# Patient Record
Sex: FEMALE | Race: WHITE
[De-identification: names, ages, dates, MRNs, and addresses within clinical notes are randomized per-mention and may not be internally consistent; named-entity substitution may affect disease eponyms.]

---

## 2022-01-13 ENCOUNTER — HOSPITAL ENCOUNTER (INPATIENT)
Dept: HOSPITAL 26 - MED | Age: 72
LOS: 4 days | Discharge: HOME | DRG: 308 | End: 2022-01-17
Attending: STUDENT IN AN ORGANIZED HEALTH CARE EDUCATION/TRAINING PROGRAM | Admitting: STUDENT IN AN ORGANIZED HEALTH CARE EDUCATION/TRAINING PROGRAM
Payer: COMMERCIAL

## 2022-01-13 VITALS — SYSTOLIC BLOOD PRESSURE: 108 MMHG | DIASTOLIC BLOOD PRESSURE: 72 MMHG

## 2022-01-13 VITALS — WEIGHT: 113.05 LBS | BODY MASS INDEX: 20.03 KG/M2 | HEIGHT: 63 IN

## 2022-01-13 DIAGNOSIS — Z85.3: ICD-10-CM

## 2022-01-13 DIAGNOSIS — I42.9: ICD-10-CM

## 2022-01-13 DIAGNOSIS — I47.1: ICD-10-CM

## 2022-01-13 DIAGNOSIS — E44.1: ICD-10-CM

## 2022-01-13 DIAGNOSIS — Z60.2: ICD-10-CM

## 2022-01-13 DIAGNOSIS — E03.9: ICD-10-CM

## 2022-01-13 DIAGNOSIS — I50.43: ICD-10-CM

## 2022-01-13 DIAGNOSIS — I48.0: Primary | ICD-10-CM

## 2022-01-13 DIAGNOSIS — Z88.2: ICD-10-CM

## 2022-01-13 DIAGNOSIS — E83.42: ICD-10-CM

## 2022-01-13 DIAGNOSIS — J96.01: ICD-10-CM

## 2022-01-13 DIAGNOSIS — Z20.822: ICD-10-CM

## 2022-01-13 DIAGNOSIS — Z88.1: ICD-10-CM

## 2022-01-13 DIAGNOSIS — Z79.899: ICD-10-CM

## 2022-01-13 DIAGNOSIS — M06.9: ICD-10-CM

## 2022-01-13 DIAGNOSIS — Z90.12: ICD-10-CM

## 2022-01-13 DIAGNOSIS — D84.9: ICD-10-CM

## 2022-01-13 LAB
ALBUMIN FLD-MCNC: 3 G/DL (ref 3.4–5)
AMYLASE SERPL-CCNC: 24 U/L (ref 25–115)
ANION GAP SERPL CALCULATED.3IONS-SCNC: 15.6 MMOL/L (ref 8–16)
APPEARANCE UR: CLEAR
AST SERPL-CCNC: 30 U/L (ref 15–37)
BASOPHILS # BLD AUTO: 0 K/UL (ref 0–0.22)
BASOPHILS NFR BLD AUTO: 0.3 % (ref 0–2)
BILIRUB SERPL-MCNC: 0.6 MG/DL (ref 0–1)
BILIRUB UR QL STRIP: NEGATIVE
BUN SERPL-MCNC: 15 MG/DL (ref 7–18)
CHLORIDE SERPL-SCNC: 108 MMOL/L (ref 98–107)
CHOLEST/HDLC SERPL: 2.2 {RATIO} (ref 1–4.5)
CO2 SERPL-SCNC: 21.5 MMOL/L (ref 21–32)
COLOR UR: YELLOW
CREAT SERPL-MCNC: 0.6 MG/DL (ref 0.6–1.3)
EOSINOPHIL # BLD AUTO: 0 K/UL (ref 0–0.4)
EOSINOPHIL NFR BLD AUTO: 0.2 % (ref 0–4)
ERYTHROCYTE [DISTWIDTH] IN BLOOD BY AUTOMATED COUNT: 17 % (ref 11.6–13.7)
GFR SERPL CREATININE-BSD FRML MDRD: (no result) ML/MIN (ref 90–?)
GLUCOSE SERPL-MCNC: 99 MG/DL (ref 74–106)
GLUCOSE UR STRIP-MCNC: NEGATIVE MG/DL
HCT VFR BLD AUTO: 39 % (ref 36–48)
HDLC SERPL-MCNC: 90 MG/DL (ref 40–60)
HGB BLD-MCNC: 12.4 G/DL (ref 12–16)
HGB UR QL STRIP: (no result)
LDLC SERPL CALC-MCNC: 97 MG/DL (ref 60–100)
LEUKOCYTE ESTERASE UR QL STRIP: NEGATIVE
LIPASE SERPL-CCNC: 56 U/L (ref 73–393)
LYMPHOCYTES # BLD AUTO: 0.6 K/UL (ref 2.5–16.5)
LYMPHOCYTES NFR BLD AUTO: 8.2 % (ref 20.5–51.1)
MAGNESIUM SERPL-MCNC: 1.7 MG/DL (ref 1.8–2.4)
MCH RBC QN AUTO: 26 PG (ref 27–31)
MCHC RBC AUTO-ENTMCNC: 32 G/DL (ref 33–37)
MCV RBC AUTO: 81.6 FL (ref 80–94)
MONOCYTES # BLD AUTO: 0.6 K/UL (ref 0.8–1)
MONOCYTES NFR BLD AUTO: 8 % (ref 1.7–9.3)
NEUTROPHILS # BLD AUTO: 6.6 K/UL (ref 1.8–7.7)
NEUTROPHILS NFR BLD AUTO: 83.3 % (ref 42.2–75.2)
NITRITE UR QL STRIP: NEGATIVE
PH UR STRIP: 5.5 [PH] (ref 5–9)
PHOSPHATE SERPL-MCNC: 3.3 MG/DL (ref 2.5–4.9)
PHOSPHATE SERPL-MCNC: 3.3 MG/DL (ref 2.5–4.9)
PLATELET # BLD AUTO: 282 K/UL (ref 140–450)
POTASSIUM SERPL-SCNC: 4.1 MMOL/L (ref 3.5–5.1)
PROTHROMBIN TIME: 11 SECS (ref 10.8–13.4)
RBC # BLD AUTO: 4.78 MIL/UL (ref 4.2–5.4)
SODIUM SERPL-SCNC: 141 MMOL/L (ref 136–145)
T4 FREE SERPL-MCNC: 1.11 NG/DL (ref 0.76–1.46)
T4 FREE SERPL-MCNC: 1.18 NG/DL (ref 0.76–1.46)
TRIGL SERPL-MCNC: 73 MG/DL (ref 30–150)
TSH SERPL DL<=0.05 MIU/L-ACNC: 0.7 UIU/ML (ref 0.34–3.74)
TSH SERPL DL<=0.05 MIU/L-ACNC: 0.7 UIU/ML (ref 0.34–3.74)
WBC # BLD AUTO: 7.9 K/UL (ref 4.8–10.8)

## 2022-01-13 RX ADMIN — MORPHINE SULFATE PRN MG: 2 INJECTION, SOLUTION INTRAMUSCULAR; INTRAVENOUS at 23:15

## 2022-01-13 RX ADMIN — SODIUM CHLORIDE SCH MLS/HR: 9 INJECTION, SOLUTION INTRAVENOUS at 23:10

## 2022-01-13 SDOH — SOCIAL STABILITY - SOCIAL INSECURITY: PROBLEMS RELATED TO LIVING ALONE: Z60.2

## 2022-01-13 NOTE — NUR
-------------------------------------------------------------------------------

            *** Note undone in ED - 01/13/22 at 1945 by [facility] ***             

-------------------------------------------------------------------------------

Pt report given to FORREST RN. Transfer of care at this time.

## 2022-01-13 NOTE — NUR
72 Y/O FEMALE BIBA FROM MD OFFICE DUE TO ELEVATED HR/DYSRHYTHMIA. PT STATES SHE 
HAS A DISCOMFORT IN HER CHEST FROM THE PALPITATIONS. DENIES CP/N/V/D AT THIS 
TIME. AOX4, ABLE TO MAKE NEEDS KNOWN. ALBE TO FOLLOW COMMANDS. PT NOTED IN SVT 
ON MONITOR AND MADE ERMD AWARE. RESP EVEN AND UNLABORED ON RA. 



PMHX: BILAT HAND CONTRACTURES, RA, BREAST CA(METS)

ALLERGIES: SULFA DRUGS

HOME MEDS: SEE LIST IN CHART

## 2022-01-14 LAB
ALBUMIN FLD-MCNC: 3.3 G/DL (ref 3.4–5)
ANION GAP SERPL CALCULATED.3IONS-SCNC: 19 MMOL/L (ref 8–16)
AST SERPL-CCNC: 25 U/L (ref 15–37)
BARBITURATES UR QL SCN: NEGATIVE NG/ML
BASOPHILS # BLD AUTO: 0 K/UL (ref 0–0.22)
BASOPHILS NFR BLD AUTO: 0.8 % (ref 0–2)
BENZODIAZ UR QL SCN: NEGATIVE NG/ML
BILIRUB SERPL-MCNC: 1 MG/DL (ref 0–1)
BUN SERPL-MCNC: 12 MG/DL (ref 7–18)
BZE UR QL SCN: NEGATIVE NG/ML
CANNABINOIDS UR QL SCN: NEGATIVE NG/ML
CHLORIDE SERPL-SCNC: 104 MMOL/L (ref 98–107)
CO2 SERPL-SCNC: 21.9 MMOL/L (ref 21–32)
CREAT SERPL-MCNC: 0.7 MG/DL (ref 0.6–1.3)
EOSINOPHIL # BLD AUTO: 0.1 K/UL (ref 0–0.4)
EOSINOPHIL NFR BLD AUTO: 1.4 % (ref 0–4)
ERYTHROCYTE [DISTWIDTH] IN BLOOD BY AUTOMATED COUNT: 16.8 % (ref 11.6–13.7)
GFR SERPL CREATININE-BSD FRML MDRD: (no result) ML/MIN (ref 90–?)
GLUCOSE SERPL-MCNC: 123 MG/DL (ref 74–106)
HCT VFR BLD AUTO: 40.1 % (ref 36–48)
HGB BLD-MCNC: 12.9 G/DL (ref 12–16)
LYMPHOCYTES # BLD AUTO: 0.9 K/UL (ref 2.5–16.5)
LYMPHOCYTES NFR BLD AUTO: 13.4 % (ref 20.5–51.1)
MAGNESIUM SERPL-MCNC: 1.9 MG/DL (ref 1.8–2.4)
MCH RBC QN AUTO: 26 PG (ref 27–31)
MCHC RBC AUTO-ENTMCNC: 32 G/DL (ref 33–37)
MCV RBC AUTO: 80.5 FL (ref 80–94)
MONOCYTES # BLD AUTO: 0.3 K/UL (ref 0.8–1)
MONOCYTES NFR BLD AUTO: 4.7 % (ref 1.7–9.3)
NEUTROPHILS # BLD AUTO: 5.2 K/UL (ref 1.8–7.7)
NEUTROPHILS NFR BLD AUTO: 79.7 % (ref 42.2–75.2)
OPIATES UR QL SCN: POSITIVE NG/ML
PCP UR QL SCN: NEGATIVE NG/ML
PLATELET # BLD AUTO: 325 K/UL (ref 140–450)
POTASSIUM SERPL-SCNC: 3.9 MMOL/L (ref 3.5–5.1)
RBC # BLD AUTO: 4.98 MIL/UL (ref 4.2–5.4)
RBC #/AREA URNS HPF: (no result) /HPF (ref 0–5)
SODIUM SERPL-SCNC: 141 MMOL/L (ref 136–145)
WBC # BLD AUTO: 6.5 K/UL (ref 4.8–10.8)
WBC,URINE: (no result) /HPF (ref 0–5)

## 2022-01-14 RX ADMIN — SODIUM CHLORIDE PRN MG: 9 INJECTION, SOLUTION INTRAVENOUS at 04:09

## 2022-01-14 RX ADMIN — MORPHINE SULFATE PRN MG: 2 INJECTION, SOLUTION INTRAMUSCULAR; INTRAVENOUS at 04:10

## 2022-01-14 RX ADMIN — SODIUM CHLORIDE SCH MLS/HR: 9 INJECTION, SOLUTION INTRAVENOUS at 05:30

## 2022-01-14 RX ADMIN — AMIODARONE HYDROCHLORIDE SCH MG: 200 TABLET ORAL at 10:31

## 2022-01-14 RX ADMIN — AMIODARONE HYDROCHLORIDE SCH MG: 200 TABLET ORAL at 20:05

## 2022-01-14 RX ADMIN — SODIUM CHLORIDE SCH MLS/HR: 9 INJECTION, SOLUTION INTRAVENOUS at 19:06

## 2022-01-14 RX ADMIN — MORPHINE SULFATE PRN MG: 2 INJECTION, SOLUTION INTRAMUSCULAR; INTRAVENOUS at 20:18

## 2022-01-14 RX ADMIN — SODIUM CHLORIDE PRN MG: 9 INJECTION, SOLUTION INTRAVENOUS at 20:18

## 2022-01-14 RX ADMIN — MORPHINE SULFATE PRN MG: 2 INJECTION, SOLUTION INTRAMUSCULAR; INTRAVENOUS at 10:48

## 2022-01-14 RX ADMIN — HYDROCODONE BITARTRATE AND ACETAMINOPHEN PRN TAB: 5; 325 TABLET ORAL at 13:25

## 2022-01-14 NOTE — NUR
Patient appears to be resting comfortably in bed. Vital Signs within normal 
limits. Respirations even and unlabored. All needs met, safety measures in 
place. Call light in reach.

## 2022-01-14 NOTE — NUR
PT IS AWAKE AND ALERT. VSS. PT IS IN STABLE CONDITION. PT DENIES CHEST PAIN AT 
THIS TIME. ALL NEEDS MET. BED LOCKED IN LOWEST POSITION, SIDE RAILS X2.

## 2022-01-14 NOTE — NUR
PATIENT HAS BEEN SCREENED AND CATEGORIZED AS MODERATE NUTRITION RISK. PATIENT WILL BE SEEN 
WITHIN 3-5 DAYS OF ADMISSION.



1/14/221/18/22



THEA MARTIN RD

## 2022-01-14 NOTE — NUR
Patient appears to be resting comfortably in bed. Vital Signs within normal 
limits. Respirations even and unlabored. Call light in reach, all needs met at 
this time

## 2022-01-14 NOTE — NUR
patient complaining of c/p that feels like pressure. originally pain was on the 
shoulders but have migrated to the chest

## 2022-01-14 NOTE — NUR
Patient appears to be resting comfortably in bed- semi fowlers, with eyes 
closed and having a 2L NC. Respirations even and unlabored. Safety measures are 
in place, attached to monitors and will continue to monitor patient. 
Medications are running appropriately. No signs of distress noted on patient.

## 2022-01-14 NOTE — NUR
PT STATES SHE FEELS A LOT BETTER WITH NONREBREATHER, RR 21. RECEIVED CALL BACK 
FROM [facility], ORDERS CARRIED OUT.

## 2022-01-14 NOTE — NUR
Pt moved to room 9 at this time. Purewick set up to suction. 400cc clear yellow 
urine obtained at this time.

## 2022-01-14 NOTE — NUR
patient feeling dizziness and discomfort HR going to 161- performed vasovagal 
maneuver and patient reverted HR going 70s.

## 2022-01-14 NOTE — NUR
Consulted with Dr Salinas about possible downgrade, per MD " let's see how her 
bp does before downgrading."

## 2022-01-14 NOTE — NUR
Pt with a c/o dull chest pain at this time. New orders noted and carried out. 
Pt VSS, remains AOX4 able to make all needs known.

## 2022-01-14 NOTE — NUR
PT REPORTED SUDDEN ONSET OF CHEST PAIN AND SOB. VSS. O2 INCREASED TO 4L. RT 
PAGED AND AT BEDSIDE. PAGED CRITICAL CARE WAITING FOR CALL BACK.

## 2022-01-15 LAB
ALBUMIN FLD-MCNC: 2.3 G/DL (ref 3.4–5)
ANION GAP SERPL CALCULATED.3IONS-SCNC: 15.6 MMOL/L (ref 8–16)
AST SERPL-CCNC: 30 U/L (ref 15–37)
BASOPHILS # BLD AUTO: 0 K/UL (ref 0–0.22)
BASOPHILS NFR BLD AUTO: 0.5 % (ref 0–2)
BILIRUB SERPL-MCNC: 1.1 MG/DL (ref 0–1)
BUN SERPL-MCNC: 10 MG/DL (ref 7–18)
CHLORIDE SERPL-SCNC: 109 MMOL/L (ref 98–107)
CO2 SERPL-SCNC: 22.4 MMOL/L (ref 21–32)
CREAT SERPL-MCNC: 0.5 MG/DL (ref 0.6–1.3)
EOSINOPHIL # BLD AUTO: 0 K/UL (ref 0–0.4)
EOSINOPHIL NFR BLD AUTO: 0.3 % (ref 0–4)
ERYTHROCYTE [DISTWIDTH] IN BLOOD BY AUTOMATED COUNT: 16 % (ref 11.6–13.7)
GFR SERPL CREATININE-BSD FRML MDRD: (no result) ML/MIN (ref 90–?)
GLUCOSE SERPL-MCNC: 64 MG/DL (ref 74–106)
HCT VFR BLD AUTO: 33.7 % (ref 36–48)
HGB BLD-MCNC: 11 G/DL (ref 12–16)
LYMPHOCYTES # BLD AUTO: 0.3 K/UL (ref 2.5–16.5)
LYMPHOCYTES NFR BLD AUTO: 3.6 % (ref 20.5–51.1)
MAGNESIUM SERPL-MCNC: 1.4 MG/DL (ref 1.8–2.4)
MCH RBC QN AUTO: 26 PG (ref 27–31)
MCHC RBC AUTO-ENTMCNC: 33 G/DL (ref 33–37)
MCV RBC AUTO: 79.7 FL (ref 80–94)
MONOCYTES # BLD AUTO: 0.6 K/UL (ref 0.8–1)
MONOCYTES NFR BLD AUTO: 6.1 % (ref 1.7–9.3)
NEUTROPHILS # BLD AUTO: 8.1 K/UL (ref 1.8–7.7)
NEUTROPHILS NFR BLD AUTO: 89.5 % (ref 42.2–75.2)
PLATELET # BLD AUTO: 219 K/UL (ref 140–450)
POTASSIUM SERPL-SCNC: 4 MMOL/L (ref 3.5–5.1)
RBC # BLD AUTO: 4.22 MIL/UL (ref 4.2–5.4)
SODIUM SERPL-SCNC: 143 MMOL/L (ref 136–145)
WBC # BLD AUTO: 9.1 K/UL (ref 4.8–10.8)

## 2022-01-15 RX ADMIN — AMIODARONE HYDROCHLORIDE SCH MG: 200 TABLET ORAL at 21:57

## 2022-01-15 RX ADMIN — FUROSEMIDE SCH MG: 10 INJECTION, SOLUTION INTRAMUSCULAR; INTRAVENOUS at 10:57

## 2022-01-15 RX ADMIN — APIXABAN SCH MG: 2.5 TABLET, FILM COATED ORAL at 14:30

## 2022-01-15 RX ADMIN — MORPHINE SULFATE PRN MG: 2 INJECTION, SOLUTION INTRAMUSCULAR; INTRAVENOUS at 14:34

## 2022-01-15 RX ADMIN — AMIODARONE HYDROCHLORIDE SCH MG: 200 TABLET ORAL at 10:56

## 2022-01-15 RX ADMIN — MORPHINE SULFATE PRN MG: 2 INJECTION, SOLUTION INTRAMUSCULAR; INTRAVENOUS at 01:50

## 2022-01-15 RX ADMIN — APIXABAN SCH MG: 2.5 TABLET, FILM COATED ORAL at 21:57

## 2022-01-15 RX ADMIN — SODIUM CHLORIDE SCH MLS/HR: 9 INJECTION, SOLUTION INTRAVENOUS at 19:25

## 2022-01-15 RX ADMIN — FUROSEMIDE SCH MG: 10 INJECTION, SOLUTION INTRAMUSCULAR; INTRAVENOUS at 17:29

## 2022-01-15 RX ADMIN — MORPHINE SULFATE PRN MG: 2 INJECTION, SOLUTION INTRAMUSCULAR; INTRAVENOUS at 17:29

## 2022-01-15 RX ADMIN — HYDROCODONE BITARTRATE AND ACETAMINOPHEN PRN TAB: 5; 325 TABLET ORAL at 23:05

## 2022-01-15 NOTE — NUR
Patient resting in semi-fowlers with both eyes closed. 15L by NRB and cardiac 
monitor remains in place. SpO2 100%. Pt denies SOB, chest pain, dizziness at 
this time. Bed locked in lowest position, side rails x 2, call light in reach.

## 2022-01-15 NOTE — NUR
PT WAS CLEANED UP, GIVEN NEW GOWN AND LINENS REPLACED. PT PLACED IN BED SEMI 
FOWLERS, BED IN LOWEST SETTING, AND RAILS UP X2.

## 2022-01-15 NOTE — NUR
Pt reports + relief to pain; 7/10 at this time. Purewick remains in place. Bed 
locked in lowest position, side rails x 2.

## 2022-01-15 NOTE — NUR
PT HAS EYES CLOSED. OPENS TO SOUND. EQUAL RISE AND FALL OF CHEST WALL. NO 
INCREASED WORK OF BREATHING OBSERVED. VSS. PT IS IN STABLE CONDITION. BED 
LOCKED IN LOWEST POSITION, SIDE RAILS X2 FOR SAFETY.

## 2022-01-15 NOTE — NUR
Patient resting in semi-fowlers position. Pt reports Fentanyl patch remains in 
place however she is in pain. Advised of next dose of Morphine. Bed locked in 
lowest position, side rails x 2.

## 2022-01-16 VITALS — SYSTOLIC BLOOD PRESSURE: 102 MMHG | DIASTOLIC BLOOD PRESSURE: 49 MMHG

## 2022-01-16 VITALS — SYSTOLIC BLOOD PRESSURE: 109 MMHG | DIASTOLIC BLOOD PRESSURE: 56 MMHG

## 2022-01-16 VITALS — DIASTOLIC BLOOD PRESSURE: 78 MMHG | SYSTOLIC BLOOD PRESSURE: 134 MMHG

## 2022-01-16 VITALS — DIASTOLIC BLOOD PRESSURE: 46 MMHG | SYSTOLIC BLOOD PRESSURE: 95 MMHG

## 2022-01-16 LAB
ALBUMIN FLD-MCNC: 2.7 G/DL (ref 3.4–5)
ANION GAP SERPL CALCULATED.3IONS-SCNC: 11.5 MMOL/L (ref 8–16)
AST SERPL-CCNC: 26 U/L (ref 15–37)
BASOPHILS # BLD AUTO: 0 K/UL (ref 0–0.22)
BASOPHILS NFR BLD AUTO: 0.3 % (ref 0–2)
BILIRUB SERPL-MCNC: 1.4 MG/DL (ref 0–1)
BUN SERPL-MCNC: 15 MG/DL (ref 7–18)
CHLORIDE SERPL-SCNC: 99 MMOL/L (ref 98–107)
CO2 SERPL-SCNC: 31 MMOL/L (ref 21–32)
CREAT SERPL-MCNC: 0.6 MG/DL (ref 0.6–1.3)
EOSINOPHIL # BLD AUTO: 0.1 K/UL (ref 0–0.4)
EOSINOPHIL NFR BLD AUTO: 0.8 % (ref 0–4)
ERYTHROCYTE [DISTWIDTH] IN BLOOD BY AUTOMATED COUNT: 16.1 % (ref 11.6–13.7)
GFR SERPL CREATININE-BSD FRML MDRD: (no result) ML/MIN (ref 90–?)
GLUCOSE SERPL-MCNC: 79 MG/DL (ref 74–106)
HCT VFR BLD AUTO: 38.4 % (ref 36–48)
HGB BLD-MCNC: 12.5 G/DL (ref 12–16)
LYMPHOCYTES # BLD AUTO: 0.5 K/UL (ref 2.5–16.5)
LYMPHOCYTES NFR BLD AUTO: 5.3 % (ref 20.5–51.1)
MAGNESIUM SERPL-MCNC: 2.1 MG/DL (ref 1.8–2.4)
MCH RBC QN AUTO: 26 PG (ref 27–31)
MCHC RBC AUTO-ENTMCNC: 33 G/DL (ref 33–37)
MCV RBC AUTO: 79.3 FL (ref 80–94)
MONOCYTES # BLD AUTO: 0.7 K/UL (ref 0.8–1)
MONOCYTES NFR BLD AUTO: 7 % (ref 1.7–9.3)
NEUTROPHILS # BLD AUTO: 8.7 K/UL (ref 1.8–7.7)
NEUTROPHILS NFR BLD AUTO: 86.6 % (ref 42.2–75.2)
PLATELET # BLD AUTO: 275 K/UL (ref 140–450)
POTASSIUM SERPL-SCNC: 3.5 MMOL/L (ref 3.5–5.1)
RBC # BLD AUTO: 4.85 MIL/UL (ref 4.2–5.4)
SODIUM SERPL-SCNC: 138 MMOL/L (ref 136–145)
WBC # BLD AUTO: 10 K/UL (ref 4.8–10.8)

## 2022-01-16 RX ADMIN — FUROSEMIDE SCH MG: 10 INJECTION, SOLUTION INTRAMUSCULAR; INTRAVENOUS at 16:50

## 2022-01-16 RX ADMIN — FUROSEMIDE SCH MG: 10 INJECTION, SOLUTION INTRAMUSCULAR; INTRAVENOUS at 09:14

## 2022-01-16 RX ADMIN — SODIUM CHLORIDE SCH MLS/HR: 9 INJECTION, SOLUTION INTRAVENOUS at 16:51

## 2022-01-16 RX ADMIN — AMIODARONE HYDROCHLORIDE SCH MG: 200 TABLET ORAL at 09:13

## 2022-01-16 RX ADMIN — AMIODARONE HYDROCHLORIDE SCH MG: 200 TABLET ORAL at 22:04

## 2022-01-16 RX ADMIN — MORPHINE SULFATE PRN MG: 2 INJECTION, SOLUTION INTRAMUSCULAR; INTRAVENOUS at 18:27

## 2022-01-16 RX ADMIN — MORPHINE SULFATE PRN MG: 2 INJECTION, SOLUTION INTRAMUSCULAR; INTRAVENOUS at 03:48

## 2022-01-16 RX ADMIN — APIXABAN SCH MG: 2.5 TABLET, FILM COATED ORAL at 22:05

## 2022-01-16 RX ADMIN — MORPHINE SULFATE PRN MG: 2 INJECTION, SOLUTION INTRAMUSCULAR; INTRAVENOUS at 09:14

## 2022-01-16 RX ADMIN — APIXABAN SCH MG: 2.5 TABLET, FILM COATED ORAL at 09:13

## 2022-01-16 RX ADMIN — MORPHINE SULFATE PRN MG: 2 INJECTION, SOLUTION INTRAMUSCULAR; INTRAVENOUS at 13:26

## 2022-01-16 NOTE — NUR
PRN PAIN MEDICATION ADMINISTERED PER MD ORDER, PT TOLERATED ADMINISTRATION. PT EDUCATION 
PROVIDED. PT ASSISTED TO THE RESTROOM AND BACK IN BED. NOW SITTING UP EATING DINNER. ALL 
NEEDS HAVE BEEN MET THROUGHOUT THE SHIFT. WILL ENDORSE TO NIGHT SHIFT NURSE

## 2022-01-16 NOTE — NUR
PT REPORTS PAIN 8/10, MEDICATED PER MD ORDER. PT TOLERATED ADMINISTRATION, IV IS PATENT. ALL 
SAFETY MEASURES IN PLACE. CALL LIGHT WITHIN REACH WILL CONTINUE TO MONITOR. SON AT BEDSIDE

## 2022-01-16 NOTE — NUR
Patient will be admitted to care of Dr. Salinas. Admited to Telemetry. Will go 
to room 106B. Belongings list completed.  Report to JIAN Purdy.

## 2022-01-16 NOTE — NUR
PT HAS BEEN BROUGHT IN BY THE ED BY BRENDEN IN STABLE CONDITION. PT AMBULATED WITH ASSIST TO 
THE RESTROOM. PT PLACED ON TELE MONITOR ON TELE BOX 26. PT REPORT 8/10 PAIN. STATES ITS 
CHRONIC. WILL MEDICATE PER MD ORDER. PT DENIES HAVING SOB, CHEST PAIN, PALPITATION, OR 
FEVERS. PT IS ON 3L NC [place] WITH SYMMETRICAL RESP. CRACKLES HEARD THROUGHOUT. NO WOUNDS 
NOTED, NO EDEMA NOTED. IV IS PATENT AND INTACT IN RIGHT FOREARM 20G. ALL SAFETY MEASURES IN 
PLACE, CALL LIGHT WITHIN REACH AND EDUCATION PROVIDED ON IMPORTANCE OF USING THE LIGHT 
BEFORE GETTING OUT OF BED. WILL CONTINUE TO MONITOR.

## 2022-01-16 NOTE — NUR
ALL NEEDS HAVE BEEN MET THROUGHOUT SHIFT, PT REMAINED STABLE. ALL SAFETY MEASURES IN PLACE, 
CALL LIGHT WITHIN REACH. PT WILL BE ENDORSED TO NIGHT SHIFT NURSE FOR CONTINUITY OF CARE.

## 2022-01-16 NOTE — NUR
PRN PAIN MEDICATION ADMINISTERED PER MD ORDER FOR 8/10 PAIN. PT IV IS PATENT. PT TOLERATED 
ADMINISTRATION AND VERBALIZE UNDERSTANDING. ALL SAFETY MEASURES IN PLACE, CALL LIGHT WITHIN 
REACH. WILL CONTINUE TO MONITOR.

## 2022-01-16 NOTE — NUR
PIERRE MEDICATION ADMINISTERED PER MD ORDER, PT TOLERATED ADMINISTRATION. PT IV PATENT AND 
INTACT. PRN PAIN MEDICATION ADMINISTERED PER MD ORDER. PT IS IN STABLE CONDITION.

## 2022-01-16 NOTE — NUR
SPOKE WITH MD REGARDING PTS MEDICATION, STATED TO CONVERT MEDICATION FOR IN HOSPITAL. ORDERS 
PLACED. FENTANYL PATCH PLACED PER MD ORDER, PT PROVIDED PERSONNEL FENTANYL PATCH FROM HOME, 
CALLED [facility], STATED TO PLACE IN [place].

## 2022-01-16 NOTE — NUR
PT PRESENTS LAYING IN BED WITH NO SIGNS OF RESPIRATORY DISTRESS SATING 97% ON RA. WILL 
CONTINUE TO MONITOR.

## 2022-01-17 VITALS — SYSTOLIC BLOOD PRESSURE: 115 MMHG | DIASTOLIC BLOOD PRESSURE: 42 MMHG

## 2022-01-17 VITALS — SYSTOLIC BLOOD PRESSURE: 102 MMHG | DIASTOLIC BLOOD PRESSURE: 42 MMHG

## 2022-01-17 VITALS — SYSTOLIC BLOOD PRESSURE: 104 MMHG | DIASTOLIC BLOOD PRESSURE: 50 MMHG

## 2022-01-17 VITALS — DIASTOLIC BLOOD PRESSURE: 42 MMHG | SYSTOLIC BLOOD PRESSURE: 115 MMHG

## 2022-01-17 LAB
ALBUMIN FLD-MCNC: 2.7 G/DL (ref 3.4–5)
ANION GAP SERPL CALCULATED.3IONS-SCNC: 11.6 MMOL/L (ref 8–16)
AST SERPL-CCNC: 27 U/L (ref 15–37)
BASOPHILS # BLD AUTO: 0 K/UL (ref 0–0.22)
BASOPHILS NFR BLD AUTO: 0.5 % (ref 0–2)
BILIRUB SERPL-MCNC: 1.1 MG/DL (ref 0–1)
BUN SERPL-MCNC: 18 MG/DL (ref 7–18)
CHLORIDE SERPL-SCNC: 99 MMOL/L (ref 98–107)
CO2 SERPL-SCNC: 31.7 MMOL/L (ref 21–32)
CREAT SERPL-MCNC: 0.7 MG/DL (ref 0.6–1.3)
EOSINOPHIL # BLD AUTO: 0.2 K/UL (ref 0–0.4)
EOSINOPHIL NFR BLD AUTO: 2 % (ref 0–4)
ERYTHROCYTE [DISTWIDTH] IN BLOOD BY AUTOMATED COUNT: 16.2 % (ref 11.6–13.7)
GFR SERPL CREATININE-BSD FRML MDRD: (no result) ML/MIN (ref 90–?)
GLUCOSE SERPL-MCNC: 85 MG/DL (ref 74–106)
HCT VFR BLD AUTO: 37.6 % (ref 36–48)
HGB BLD-MCNC: 12.5 G/DL (ref 12–16)
LYMPHOCYTES # BLD AUTO: 0.6 K/UL (ref 2.5–16.5)
LYMPHOCYTES NFR BLD AUTO: 7.1 % (ref 20.5–51.1)
MAGNESIUM SERPL-MCNC: 1.6 MG/DL (ref 1.8–2.4)
MCH RBC QN AUTO: 26 PG (ref 27–31)
MCHC RBC AUTO-ENTMCNC: 33 G/DL (ref 33–37)
MCV RBC AUTO: 79 FL (ref 80–94)
MONOCYTES # BLD AUTO: 0.6 K/UL (ref 0.8–1)
MONOCYTES NFR BLD AUTO: 7.4 % (ref 1.7–9.3)
NEUTROPHILS # BLD AUTO: 7 K/UL (ref 1.8–7.7)
NEUTROPHILS NFR BLD AUTO: 83 % (ref 42.2–75.2)
PLATELET # BLD AUTO: 283 K/UL (ref 140–450)
POTASSIUM SERPL-SCNC: 3.3 MMOL/L (ref 3.5–5.1)
RBC # BLD AUTO: 4.76 MIL/UL (ref 4.2–5.4)
SODIUM SERPL-SCNC: 139 MMOL/L (ref 136–145)
WBC # BLD AUTO: 8.4 K/UL (ref 4.8–10.8)

## 2022-01-17 RX ADMIN — MORPHINE SULFATE PRN MG: 2 INJECTION, SOLUTION INTRAMUSCULAR; INTRAVENOUS at 10:59

## 2022-01-17 RX ADMIN — AMIODARONE HYDROCHLORIDE SCH MG: 200 TABLET ORAL at 09:00

## 2022-01-17 RX ADMIN — APIXABAN SCH MG: 2.5 TABLET, FILM COATED ORAL at 09:12

## 2022-01-17 RX ADMIN — FUROSEMIDE SCH MG: 10 INJECTION, SOLUTION INTRAMUSCULAR; INTRAVENOUS at 09:09

## 2022-01-17 RX ADMIN — MORPHINE SULFATE PRN MG: 2 INJECTION, SOLUTION INTRAMUSCULAR; INTRAVENOUS at 06:20

## 2022-01-17 NOTE — NUR
PT DISCHARGED WITH BELONGINGS AND PT'S HOME FENTANYL PATCHES (15PATCHES), PICKED UP BY SON 
JACQUELINE VIA PRIVATE TRANSPORTATION. RESPIRATION EVEN AND UNLABORED. NO SOB NOTED AT THIS 
TIME. AMBULATORY. no

## 2022-01-17 NOTE — NUR
HAD C/O PAIN CHECKED VW=071/55.MORPHINE IVP GIVEN EARLIER.HAS TOLERABLE PAIN NOW.REPORT 
GIVEN TO AM RN IN STABLE CONDITION.

## 2022-01-17 NOTE — NUR
PT IN BED, ALERT AND AWAKE. BREATHING SYMMETRICAL WITH O2 VIA NC AT 3L. NO SOB NOTED. PAIN 
DECREASED TO 2. CALL LIGHT WITHIN REACH.

## 2022-01-17 NOTE — NUR
DISCHARGE ORDER IN PLACE, PT MADE AWARE. CALLED CORINE (BROTHER) [phone] AND BERTIN 
(SON) [phone] AND NOTIFIED. JACQUELINE TO  PT.

## 2022-01-17 NOTE — NUR
PT WEANED OFF OF O2 VIA NC. PT TOLERATING WELL, NO SOB NOTED. O2 SAT ON ROOM AIR 94-96% 
DENIES SHORTNESS OF BREATH.

## 2022-01-17 NOTE — NUR
RECEIVED REPORT FROM NIGHT SHIFT NURSE FOR CONTINUITY OF CARE. PT IN BED AWAKE, ALERT. 
RESPIRATION EVEN AND UNLABORED. DENIES PAIN OR DISCOMFORT AT THIS TIME. WITH RFA 20G RUNNING 
WITH NS RUNNING AT 30CC. CALL LIGHT PLACED WITHIN REACH. ALL SAFETY MEASURES IN PLACE.